# Patient Record
Sex: MALE | Race: WHITE | HISPANIC OR LATINO | Employment: FULL TIME | URBAN - METROPOLITAN AREA
[De-identification: names, ages, dates, MRNs, and addresses within clinical notes are randomized per-mention and may not be internally consistent; named-entity substitution may affect disease eponyms.]

---

## 2020-09-24 ENCOUNTER — HOSPITAL ENCOUNTER (EMERGENCY)
Facility: HOSPITAL | Age: 45
Discharge: HOME | End: 2020-09-24
Attending: EMERGENCY MEDICINE | Admitting: EMERGENCY MEDICINE
Payer: COMMERCIAL

## 2020-09-24 ENCOUNTER — APPOINTMENT (EMERGENCY)
Dept: CT IMAGING | Facility: HOSPITAL | Age: 45
End: 2020-09-24
Payer: COMMERCIAL

## 2020-09-24 VITALS
RESPIRATION RATE: 20 BRPM | OXYGEN SATURATION: 99 % | TEMPERATURE: 97.4 F | HEART RATE: 77 BPM | BODY MASS INDEX: 26.2 KG/M2 | SYSTOLIC BLOOD PRESSURE: 156 MMHG | HEIGHT: 66 IN | DIASTOLIC BLOOD PRESSURE: 92 MMHG | WEIGHT: 163 LBS

## 2020-09-24 DIAGNOSIS — R10.9 LEFT FLANK PAIN: ICD-10-CM

## 2020-09-24 DIAGNOSIS — E83.59 NEPHROCALCINOSIS: ICD-10-CM

## 2020-09-24 DIAGNOSIS — N29 NEPHROCALCINOSIS: ICD-10-CM

## 2020-09-24 DIAGNOSIS — K57.92 DIVERTICULITIS: Primary | ICD-10-CM

## 2020-09-24 LAB
ANION GAP SERPL CALCULATED.3IONS-SCNC: 9 MMOL/L (ref 4–13)
BACTERIA UR QL AUTO: ABNORMAL /HPF
BASOPHILS # BLD AUTO: 0.04 THOUSANDS/ΜL (ref 0–0.1)
BASOPHILS NFR BLD AUTO: 1 % (ref 0–1)
BILIRUB UR QL STRIP: NEGATIVE
BUN SERPL-MCNC: 16 MG/DL (ref 5–25)
CALCIUM SERPL-MCNC: 8.8 MG/DL (ref 8.3–10.1)
CHLORIDE SERPL-SCNC: 104 MMOL/L (ref 100–108)
CLARITY UR: CLEAR
CO2 SERPL-SCNC: 28 MMOL/L (ref 21–32)
COLOR UR: YELLOW
CREAT SERPL-MCNC: 0.91 MG/DL (ref 0.6–1.3)
EOSINOPHIL # BLD AUTO: 0.26 THOUSAND/ΜL (ref 0–0.61)
EOSINOPHIL NFR BLD AUTO: 3 % (ref 0–6)
ERYTHROCYTE [DISTWIDTH] IN BLOOD BY AUTOMATED COUNT: 12.8 % (ref 11.6–15.1)
GFR SERPL CREATININE-BSD FRML MDRD: 101 ML/MIN/1.73SQ M
GLUCOSE SERPL-MCNC: 169 MG/DL (ref 65–140)
GLUCOSE UR STRIP-MCNC: NEGATIVE MG/DL
HCT VFR BLD AUTO: 47.8 % (ref 36.5–49.3)
HGB BLD-MCNC: 15.6 G/DL (ref 12–17)
HGB UR QL STRIP.AUTO: ABNORMAL
HYALINE CASTS #/AREA URNS LPF: ABNORMAL /LPF
IMM GRANULOCYTES # BLD AUTO: 0.03 THOUSAND/UL (ref 0–0.2)
IMM GRANULOCYTES NFR BLD AUTO: 0 % (ref 0–2)
KETONES UR STRIP-MCNC: ABNORMAL MG/DL
LEUKOCYTE ESTERASE UR QL STRIP: NEGATIVE
LYMPHOCYTES # BLD AUTO: 2.64 THOUSANDS/ΜL (ref 0.6–4.47)
LYMPHOCYTES NFR BLD AUTO: 32 % (ref 14–44)
MCH RBC QN AUTO: 27.4 PG (ref 26.8–34.3)
MCHC RBC AUTO-ENTMCNC: 32.6 G/DL (ref 31.4–37.4)
MCV RBC AUTO: 84 FL (ref 82–98)
MONOCYTES # BLD AUTO: 0.82 THOUSAND/ΜL (ref 0.17–1.22)
MONOCYTES NFR BLD AUTO: 10 % (ref 4–12)
MUCOUS THREADS UR QL AUTO: ABNORMAL
NEUTROPHILS # BLD AUTO: 4.6 THOUSANDS/ΜL (ref 1.85–7.62)
NEUTS SEG NFR BLD AUTO: 54 % (ref 43–75)
NITRITE UR QL STRIP: NEGATIVE
NON-SQ EPI CELLS URNS QL MICRO: ABNORMAL /HPF
NRBC BLD AUTO-RTO: 0 /100 WBCS
PH UR STRIP.AUTO: 5.5 [PH]
PLATELET # BLD AUTO: 257 THOUSANDS/UL (ref 149–390)
PMV BLD AUTO: 11 FL (ref 8.9–12.7)
POTASSIUM SERPL-SCNC: 3.7 MMOL/L (ref 3.5–5.3)
PROT UR STRIP-MCNC: ABNORMAL MG/DL
RBC # BLD AUTO: 5.7 MILLION/UL (ref 3.88–5.62)
RBC #/AREA URNS AUTO: ABNORMAL /HPF
SODIUM SERPL-SCNC: 141 MMOL/L (ref 136–145)
SP GR UR STRIP.AUTO: >=1.03 (ref 1–1.03)
UROBILINOGEN UR QL STRIP.AUTO: 0.2 E.U./DL
WBC # BLD AUTO: 8.39 THOUSAND/UL (ref 4.31–10.16)
WBC #/AREA URNS AUTO: ABNORMAL /HPF

## 2020-09-24 PROCEDURE — 96374 THER/PROPH/DIAG INJ IV PUSH: CPT

## 2020-09-24 PROCEDURE — 74176 CT ABD & PELVIS W/O CONTRAST: CPT

## 2020-09-24 PROCEDURE — 99284 EMERGENCY DEPT VISIT MOD MDM: CPT

## 2020-09-24 PROCEDURE — 96375 TX/PRO/DX INJ NEW DRUG ADDON: CPT

## 2020-09-24 PROCEDURE — 96361 HYDRATE IV INFUSION ADD-ON: CPT

## 2020-09-24 PROCEDURE — 81001 URINALYSIS AUTO W/SCOPE: CPT | Performed by: EMERGENCY MEDICINE

## 2020-09-24 PROCEDURE — 36415 COLL VENOUS BLD VENIPUNCTURE: CPT | Performed by: EMERGENCY MEDICINE

## 2020-09-24 PROCEDURE — 80048 BASIC METABOLIC PNL TOTAL CA: CPT | Performed by: EMERGENCY MEDICINE

## 2020-09-24 PROCEDURE — 85025 COMPLETE CBC W/AUTO DIFF WBC: CPT | Performed by: EMERGENCY MEDICINE

## 2020-09-24 PROCEDURE — 99285 EMERGENCY DEPT VISIT HI MDM: CPT | Performed by: EMERGENCY MEDICINE

## 2020-09-24 PROCEDURE — G1004 CDSM NDSC: HCPCS

## 2020-09-24 RX ORDER — ONDANSETRON 4 MG/1
4 TABLET, ORALLY DISINTEGRATING ORAL EVERY 8 HOURS PRN
Qty: 10 TABLET | Refills: 0 | Status: SHIPPED | OUTPATIENT
Start: 2020-09-24

## 2020-09-24 RX ORDER — NAPROXEN 500 MG/1
500 TABLET ORAL 2 TIMES DAILY PRN
Qty: 20 TABLET | Refills: 0 | Status: SHIPPED | OUTPATIENT
Start: 2020-09-24

## 2020-09-24 RX ORDER — AMOXICILLIN AND CLAVULANATE POTASSIUM 562.5; 437.5; 62.5 MG/1; MG/1; MG/1
2 TABLET, FILM COATED, EXTENDED RELEASE ORAL 2 TIMES DAILY
Qty: 28 TABLET | Refills: 0 | Status: SHIPPED | OUTPATIENT
Start: 2020-09-24 | End: 2020-10-01

## 2020-09-24 RX ORDER — NAPROXEN 500 MG/1
500 TABLET ORAL 2 TIMES DAILY PRN
Qty: 20 TABLET | Refills: 0 | Status: SHIPPED | OUTPATIENT
Start: 2020-09-24 | End: 2020-09-24

## 2020-09-24 RX ORDER — KETOROLAC TROMETHAMINE 30 MG/ML
15 INJECTION, SOLUTION INTRAMUSCULAR; INTRAVENOUS ONCE
Status: COMPLETED | OUTPATIENT
Start: 2020-09-24 | End: 2020-09-24

## 2020-09-24 RX ORDER — AMOXICILLIN AND CLAVULANATE POTASSIUM 875; 125 MG/1; MG/1
1 TABLET, FILM COATED ORAL ONCE
Status: COMPLETED | OUTPATIENT
Start: 2020-09-24 | End: 2020-09-24

## 2020-09-24 RX ORDER — MORPHINE SULFATE 15 MG/1
15 TABLET ORAL EVERY 6 HOURS PRN
Qty: 7 TABLET | Refills: 0 | Status: SHIPPED | OUTPATIENT
Start: 2020-09-24 | End: 2020-10-01

## 2020-09-24 RX ORDER — METHOCARBAMOL 500 MG/1
500 TABLET, FILM COATED ORAL 2 TIMES DAILY PRN
Qty: 20 TABLET | Refills: 0 | Status: SHIPPED | OUTPATIENT
Start: 2020-09-24 | End: 2020-09-24

## 2020-09-24 RX ORDER — HYDROMORPHONE HCL/PF 1 MG/ML
0.5 SYRINGE (ML) INJECTION ONCE AS NEEDED
Status: COMPLETED | OUTPATIENT
Start: 2020-09-24 | End: 2020-09-24

## 2020-09-24 RX ADMIN — HYDROMORPHONE HYDROCHLORIDE 0.5 MG: 1 INJECTION, SOLUTION INTRAMUSCULAR; INTRAVENOUS; SUBCUTANEOUS at 06:42

## 2020-09-24 RX ADMIN — AMOXICILLIN AND CLAVULANATE POTASSIUM 1 TABLET: 875; 125 TABLET, FILM COATED ORAL at 06:42

## 2020-09-24 RX ADMIN — SODIUM CHLORIDE 1000 ML: 0.9 INJECTION, SOLUTION INTRAVENOUS at 06:04

## 2020-09-24 RX ADMIN — KETOROLAC TROMETHAMINE 15 MG: 30 INJECTION, SOLUTION INTRAMUSCULAR at 06:01

## 2020-09-24 NOTE — ED PROVIDER NOTES
History  Chief Complaint   Patient presents with    Flank Pain     Patient reports L sided flank pain since yesterday  Patient reports no change in his urine  53year-old male presents with 2 days of left flank pain without radiation  Patient describes moderate aching pain that came on while resting and continues in the ER  Patient states nothing aggravates the pain and nothing alleviates it  Patient denies any history of prior kidney stones  Patient has remote history of prior pyelonephritis  Patient does note occasional back pain over the past few days that was initially bilateral but subsequently has resolved and is currently only in the left flank  Patient denies any injury  Patient denies any loss of bowel or bladder  Patient denies any sensory loss  Patient denies any weakness  Patient denies any history of IV drug use  Patient denies any fever/chills  Patient denies any history of immunocompromise state other than diabetes that he states is controlled and follows with primary care  ROS: Patient denies nausea/vomiting, diarrhea, dyspnea, anorexia, constipation, diaphoresis, chest pain, groin pain, dysuria, hematuria, melena, or neck pain  All other systems reviewed and negative  Patient denies contacts with similar symptoms  Patient denies any recent use of antibiotics, international travel, or trauma  Objective:   Vital signs reviewed  Abdomen: Inspection of an abdomen without previous abdominal surgical incisions noted without erythema, rashes or ecchymosis noted  No abdominal pulsations noted  Normal bowel sounds with no bruit auscultated  Soft abdomen  Palpitation noted no tenderness to palpitation; tenderness not over McBurneys point  No masses or pulsatile aorta noted on examination  No rebound or guarding noted on examination, non-peritoneal exam    Back: Normal inspection with no rash or signs of herpes zoster   ROM of spine is slowed but otherwise normal  Patient notes pain located left flank only, no midline pain  Tenderness to palpation in the left flank, no midline tenderness; costovertebral tenderness present on the left  Normal stand (S1) and sit (S3)  Gait is normal  Normal resistance dorsiflex great toes bilaterally (L5)  Sensation normal on the legs including the anterior medial thigh (L2), medial epicondyle femur (L3), medial malleolus (L4), dorsal 3rd MTP (L5), lateral calcaneus (S1), popliteal fossa (S2)  Medical Decision Making   Patient presents with left flank pain with a broad differential including intra-abdominal pathologies such as nephrolithiasis and pyelonephritis  No midline tenderness and patient denies worsening with movement  Possibly secondary to renal pathology, infectious or obstructive  Alternative a could represent musculoskeletal   No signs or symptoms of cauda equina and no midline pain or radiation consistent with radiculopathy  Patient denies poorly controlled diabetes, chronic kidney disease, history of congenital urinary abnormality or renal transplant, or history of immunocompromised state  Based on the patients presentation and symptoms, laboratory evaluation to include urinalysis to evaluate for hematuria and infectious etiologies, BMP to evaluate renal function and electrolytes, and CBC to evaluate for leukocytosis will be completed  Non-contrast CT imaging using renal protocol will be completed to evaluate for nephrolithiasis or underlying anatomic abnormality to detect processes that may delay response to therapy or complications such as renal or perinephric assesses           Flank Pain   Pain location:  L flank  Pain quality: aching    Pain radiates to:  Does not radiate  Pain severity:  Moderate  Onset quality:  Gradual  Timing:  Constant  Progression:  Worsening  Relieved by:  Nothing  Worsened by:  Nothing  Associated symptoms: no chest pain, no chills, no cough, no diarrhea, no dysuria, no fever, no hematemesis, no hematochezia, no hematuria, no melena, no nausea, no shortness of breath and no vomiting        None       Past Medical History:   Diagnosis Date    Diabetes mellitus ([address] )     Hypertension        History reviewed  No pertinent surgical history  History reviewed  No pertinent family history  I have reviewed and agree with the history as documented  E-Cigarette/Vaping     E-Cigarette/Vaping Substances     Social History     Tobacco Use    Smoking status: Never Smoker    Smokeless tobacco: Never Used   Substance Use Topics    Alcohol use: Not Currently    Drug use: Not Currently       Review of Systems   Constitutional: Negative for chills and fever  Respiratory: Negative for cough and shortness of breath  Cardiovascular: Negative for chest pain  Gastrointestinal: Negative for diarrhea, hematemesis, hematochezia, melena, nausea and vomiting  Genitourinary: Positive for flank pain  Negative for dysuria and hematuria  All other systems reviewed and are negative  Physical Exam  Physical Exam  Vitals signs reviewed  Constitutional:       Appearance: Normal appearance  HENT:      Head: Normocephalic and atraumatic  Mouth/Throat:      Mouth: Mucous membranes are moist    Eyes:      Pupils: Pupils are equal, round, and reactive to light  Neck:      Musculoskeletal: Neck supple  Cardiovascular:      Rate and Rhythm: Normal rate  Pulmonary:      Effort: Pulmonary effort is normal    Abdominal:      General: There is no distension  Tenderness: There is no abdominal tenderness  There is left CVA tenderness  There is no right CVA tenderness, guarding or rebound  Musculoskeletal:         General: No deformity  Skin:     General: Skin is warm and dry  Neurological:      General: No focal deficit present  Mental Status: He is alert  Comments: See HPI for details     Psychiatric:         Mood and Affect: Mood normal          Vital Signs  ED Triage Vitals Temperature Pulse Respirations Blood Pressure SpO2   09/24/20 0529 09/24/20 0529 09/24/20 0529 09/24/20 0545 09/24/20 0529   (!) 97 4 °F (36 3 °C) 77 20 156/92 99 %      Temp Source Heart Rate Source Patient Position - Orthostatic VS BP Location FiO2 (%)   09/24/20 0529 09/24/20 0529 09/24/20 0529 09/24/20 0529 --   Oral Monitor Lying Right arm       Pain Score       09/24/20 0641       5           Vitals:    09/24/20 0529 09/24/20 0545   BP:  156/92   Pulse: 77    Patient Position - Orthostatic VS: Lying Lying         Visual Acuity      ED Medications  Medications   sodium chloride 0 9 % bolus 1,000 mL (0 mL Intravenous Stopped 9/24/20 0651)   ketorolac (TORADOL) injection 15 mg (15 mg Intravenous Given 9/24/20 0601)   HYDROmorphone (DILAUDID) injection 0 5 mg (0 5 mg Intravenous Given 9/24/20 0642)   amoxicillin-clavulanate (AUGMENTIN) 875-125 mg per tablet 1 tablet (1 tablet Oral Given 9/24/20 0642)       Diagnostic Studies  Results Reviewed     Procedure Component Value Units Date/Time    Basic metabolic panel [[id number]]  (Abnormal) Collected:  09/24/20 0542    Lab Status:  Final result Specimen:  Blood from Arm, Right Updated:  09/24/20 0603     Sodium 141 mmol/L      Potassium 3 7 mmol/L      Chloride 104 mmol/L      CO2 28 mmol/L      ANION GAP 9 mmol/L      BUN 16 mg/dL      Creatinine 0 91 mg/dL      Glucose 169 mg/dL      Calcium 8 8 mg/dL      eGFR 101 ml/min/1 73sq m     Narrative:       [place] guidelines for Chronic Kidney Disease (CKD):     Stage 1 with normal or high GFR (GFR > 90 mL/min/1 73 square meters)    Stage 2 Mild CKD (GFR = 60-89 mL/min/1 73 square meters)    Stage 3A Moderate CKD (GFR = 45-59 mL/min/1 73 square meters)    Stage 3B Moderate CKD (GFR = 30-44 mL/min/1 73 square meters)    Stage 4 Severe CKD (GFR = 15-29 mL/min/1 73 square meters)    Stage 5 End Stage CKD (GFR <15 mL/min/1 73 square meters)  Note: GFR calculation is accurate only with a steady state creatinine    Urine Microscopic [[id number]]  (Abnormal) Collected:  09/24/20 0546    Lab Status:  Final result Specimen:  Urine, Clean Catch Updated:  09/24/20 0602     RBC, UA 1-2 /hpf      WBC, UA None Seen /hpf      Epithelial Cells Occasional /hpf      Bacteria, UA Occasional /hpf      Hyaline Casts, UA 0-1 /lpf      MUCUS THREADS Occasional    CBC and differential [[id number]]  (Abnormal) Collected:  09/24/20 0542    Lab Status:  Final result Specimen:  Blood from Arm, Right Updated:  09/24/20 0555     WBC 8 39 Thousand/uL      RBC 5 70 Million/uL      Hemoglobin 15 6 g/dL      Hematocrit 47 8 %      MCV 84 fL      MCH 27 4 pg      MCHC 32 6 g/dL      RDW 12 8 %      MPV 11 0 fL      Platelets 733 Thousands/uL      nRBC 0 /100 WBCs      Neutrophils Relative 54 %      Immat GRANS % 0 %      Lymphocytes Relative 32 %      Monocytes Relative 10 %      Eosinophils Relative 3 %      Basophils Relative 1 %      Neutrophils Absolute 4 60 Thousands/µL      Immature Grans Absolute 0 03 Thousand/uL      Lymphocytes Absolute 2 64 Thousands/µL      Monocytes Absolute 0 82 Thousand/µL      Eosinophils Absolute 0 26 Thousand/µL      Basophils Absolute 0 04 Thousands/µL     UA w Reflex to Microscopic w Reflex to Culture [[id number]]  (Abnormal) Collected:  09/24/20 0546    Lab Status:  Final result Specimen:  Urine, Clean Catch Updated:  09/24/20 0555     Color, UA Yellow     Clarity, UA Clear     Specific Gravity, UA >=1 030     pH, UA 5 5     Leukocytes, UA Negative     Nitrite, UA Negative     Protein, UA Trace mg/dl      Glucose, UA Negative mg/dl      Ketones, UA Trace mg/dl      Urobilinogen, UA 0 2 E U /dl      Bilirubin, UA Negative     Blood, UA Trace-Intact                 CT renal stone study abdomen pelvis wo contrast   Final Result by Patricia Benton DO (09/24 0615)   1    Diffuse diverticular changes throughout the colon with mild inflammatory changes surrounding the distal descending colon/proximal sigmoid colon, suspicious for early, acute noncomplicated diverticulitis  2   Nonobstructing bilateral renal calculi  3   Mild increased density of the renal pyramids bilaterally, suggesting medullary nephrocalcinosis  4   Abnormal appearance of the prostate gland, suggesting prior prostatitis  Consider follow-up outpatient neurology consultation  5   Mild hepatosplenomegaly  The study was marked in [facility] for immediate notification  Workstation performed: [id number]                    Procedures  Procedures         ED Course  ED Course as of Sep 24 0700   Thu Sep 24, 2020   0630 Calcium: 8 8   8019 Patient's CT with findings concerning for acute diverticulitis  Reassess patient, continues to have no abdominal pain or tenderness to palpation  Patient's pain did improve with Ketoralac, continues to have some left flank pain  I discussed significant diagnostic uncertainty regarding this  Considering CT imaging, discuss empiric antibiotic treatment but I emphasized diagnostic uncertainty  Discussed the need for follow-up with Gastroenterology and provided patient with a CD of his CT scan as he is not from the area, living in [place]  I discussed re-evaluation through primary care with Gastroenterology and patient was amenable to this  Patient's CT although with nephrocalcinosis, no hypercalcemia on imaging and no significant hematuria  Discussed follow-up for monitoring with primary care on renal function considering this  Patient's CT also with questionable prior prostatitis I discussed follow-up with Urology  Discussed symptomatic management  Discussed risks and benefits of medications including specific risks associated with narcotic pain medicine  Discussed emphasized return precautions  Emphasized the need for follow-up considering diagnostic uncertainty considering history and CT imaging results  Patient understand and agress with plan        1612 I have reasonably determine that electronically prescribing a controlled substance would be impractical for the patient to obtain the controlled substance prescribed by electronic prescription or would cause an untimely delay resulting in an adverse impact on the patient's medical condition   aware queried with no unusual prescription patterns  SBIRT 20yo+      Most Recent Value   SBIRT (24 yo +)   In order to provide better care to our patients, we are screening all of our patients for alcohol and drug use  Would it be okay to ask you these screening questions? Yes Filed at: 09/24/2020 0535   Initial Alcohol Screen: US AUDIT-C    1  How often do you have a drink containing alcohol? 1 Filed at: 09/24/2020 0535   2  How many drinks containing alcohol do you have on a typical day you are drinking? 1 Filed at: 09/24/2020 0535   3a  Male UNDER 65: How often do you have five or more drinks on one occasion? 0 Filed at: 09/24/2020 0535   3b  FEMALE Any Age, or MALE 65+: How often do you have 4 or more drinks on one occassion? 0 Filed at: 09/24/2020 0535   Audit-C Score  2 Filed at: 09/24/2020 7809   MELINDA: How many times in the past year have you    Used an illegal drug or used a prescription medication for non-medical reasons?   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[address] )     9/24/2020  6:33 AM Jalaine [place] Remove [N17 2] Nephritis and nephropathy, with renal medullary necrosis ([facility] [address] )     9/24/2020  6:33 AM Jalaine [place] Add [E83 59,  N29] Nephrocalcinosis 9/24/2020  6:33 AM Johny Lute Add [K57 92] Diverticulitis     9/24/2020  6:33 AM Johny Lute Modify [R10 9] Left flank pain     9/24/2020  6:33 AM Johny Lute Modify [K57 92] Diverticulitis       ED Disposition     ED Disposition Condition Date/Time Comment    Discharge Stable Thu Sep 24, 2020  6:03 AM [facility] discharge to home/self care  Follow-up Information     Follow up With Specialties Details Why Contact Info Additional Information    Primary Care Physician  Schedule an appointment as soon as possible for a visit in 3 days Follow-up and reassessment  Monitoring of kidney function considering possibility of nephrocalcinosis  [address] [facility] Emergency Department Emergency Medicine Go to  If symptoms worsen [address] [facility] [id number] [phone] [place] ED, [address] [place], [place], [address]  [facility] [place] [facility]  Follow up with gastroenterology regarding possibility diverticulitis and to evaluate for other potential etiologies  [place] [address] [place] [phone]  [facility] [place] , [facility], [place], [place], [address] [facility]  Follow up with urology regarding possible prior prostatitis   [address] [address]  [address]  [facility], [address] [facility] [facility], [address] [place] [address], [address]          Discharge Medication List as of 9/24/2020  6:41 AM      START taking these medications    Details   amoxicillin-clavulanate (AUGMENTIN XR) 1,000-62 5 mg per tablet Take 2 tablets by mouth 2 (two) times a day for 7 days, Starting Thu 9/24/2020, Until Thu 10/1/2020, Print      morphine (MSIR) 15 mg tablet Take 1 tablet (15 mg total) by mouth every 6 (six) hours as needed for severe pain for up to 7 daysMax Daily Amount: 60 mg, Starting Thu 9/24/2020, Until Thu 10/1/2020, Print      naproxen (NAPROSYN) 500 mg tablet Take 1 tablet (500 mg total) by mouth 2 (two) times a day as needed for moderate pain for up to 20 doses, Starting Thu 9/24/2020, Print      ondansetron (ZOFRAN-ODT) 4 mg disintegrating tablet Take 1 tablet (4 mg total) by mouth every 8 (eight) hours as needed for nausea or vomiting, Starting Thu 9/24/2020, Print           No discharge procedures on file      PDMP Review       Value Time User    PDMP Reviewed  Yes 9/24/2020  6:48 AM Sarahy Urrutia MD          ED Provider  Electronically Signed by           Sarahy Urrutia MD  09/24/20 0526